# Patient Record
Sex: FEMALE | Race: WHITE | URBAN - METROPOLITAN AREA
[De-identification: names, ages, dates, MRNs, and addresses within clinical notes are randomized per-mention and may not be internally consistent; named-entity substitution may affect disease eponyms.]

---

## 2023-09-11 ENCOUNTER — OFFICE VISIT (OUTPATIENT)
Dept: ORTHOPEDIC SURGERY | Age: 83
End: 2023-09-11

## 2023-09-11 VITALS — BODY MASS INDEX: 28.35 KG/M2 | WEIGHT: 160 LBS | RESPIRATION RATE: 16 BRPM | HEIGHT: 63 IN

## 2023-09-11 DIAGNOSIS — M18.12 OSTEOARTHRITIS OF CARPOMETACARPAL (CMC) JOINT OF LEFT THUMB, UNSPECIFIED OSTEOARTHRITIS TYPE: Primary | ICD-10-CM

## 2023-09-11 NOTE — PROGRESS NOTES
Ms. Alondra Hope is a 80 y.o. right handed woman  who is seen today in Hand Surgical Consultation at the request of Zoila Steinberg MD.    She is seen today regarding a several year(s) history of bilateral basilar thumb pain without history of previous injury. She  was not seen for this concern by her primary care physician; previous treatment has included conservative measures, activity modification, avoidance of bothersome tasks, and OTC medication. She  reports moderate Enlargement about the base of the Thumb, Basilar Thumb pain, Thumb Stiffness, and Pain with pinch & grasp located about the base of both thumbs at the level of the [facility] Joint, no tenderness of the remaining hand, wrist, or elbow on either side. She notes today, no neurologic symptoms in the hands. Symptoms are improving over time. I have today reviewed with Alodnra Hope the clinically relevant, past medical history, medications, allergies,  family history, social history, and Review Of Systems & I have documented any details relevant to today's presenting complaints in my history above. Ms. Jodee Moreno's self-reported past medical history, medications, allergies,  family history, social history, and Review Of Systems have been scanned into the chart under the \"Media\" tab. Physical Exam:  Ms. Jodee Moreno's most recent vitals:  Vitals  Respirations: 16  Height: 5' 3\" (160 cm)  Weight - Scale: 160 lb (72.6 kg)    She is well nourished, oriented to person, place & time. She demonstrates appropriate mood and affect as well as normal gait and station. Skin: Normal in appearance, Normal Color, and Free of Lesions Bilateral   Finger range of motion is Full and equal bilaterally. Thumb range of motion is  decreased in all spheres at the basilar joint bilaterally   Wrist range of motion is Full and equal bilaterally   There is no evidence of gross joint instability bilaterally.   Sensation is subjectively normal and present

## 2024-06-05 ENCOUNTER — OFFICE VISIT (OUTPATIENT)
Dept: PULMONOLOGY | Age: 84
End: 2024-06-05
Payer: MEDICARE

## 2024-06-05 VITALS
WEIGHT: 161 LBS | HEIGHT: 63 IN | TEMPERATURE: 97.6 F | OXYGEN SATURATION: 95 % | BODY MASS INDEX: 28.53 KG/M2 | RESPIRATION RATE: 18 BRPM | SYSTOLIC BLOOD PRESSURE: 138 MMHG | HEART RATE: 127 BPM | DIASTOLIC BLOOD PRESSURE: 88 MMHG

## 2024-06-05 DIAGNOSIS — R06.02 SOB (SHORTNESS OF BREATH): Primary | ICD-10-CM

## 2024-06-05 DIAGNOSIS — G47.39 MIXED SLEEP APNEA: ICD-10-CM

## 2024-06-05 PROCEDURE — 99204 OFFICE O/P NEW MOD 45 MIN: CPT | Performed by: INTERNAL MEDICINE

## 2024-06-05 PROCEDURE — G8419 CALC BMI OUT NRM PARAM NOF/U: HCPCS | Performed by: INTERNAL MEDICINE

## 2024-06-05 PROCEDURE — 1036F TOBACCO NON-USER: CPT | Performed by: INTERNAL MEDICINE

## 2024-06-05 PROCEDURE — 1123F ACP DISCUSS/DSCN MKR DOCD: CPT | Performed by: INTERNAL MEDICINE

## 2024-06-05 PROCEDURE — G8400 PT W/DXA NO RESULTS DOC: HCPCS | Performed by: INTERNAL MEDICINE

## 2024-06-05 PROCEDURE — 1090F PRES/ABSN URINE INCON ASSESS: CPT | Performed by: INTERNAL MEDICINE

## 2024-06-05 PROCEDURE — G8427 DOCREV CUR MEDS BY ELIG CLIN: HCPCS | Performed by: INTERNAL MEDICINE

## 2024-06-05 RX ORDER — OMEPRAZOLE 20 MG/1
20 CAPSULE, DELAYED RELEASE ORAL DAILY
COMMUNITY

## 2024-06-05 RX ORDER — LISINOPRIL 40 MG/1
40 TABLET ORAL DAILY
COMMUNITY
Start: 2024-03-01

## 2024-06-05 RX ORDER — ASPIRIN 81 MG/1
81 TABLET, CHEWABLE ORAL DAILY
COMMUNITY
Start: 2024-03-04

## 2024-06-05 RX ORDER — FLECAINIDE ACETATE 50 MG/1
50 TABLET ORAL EVERY 12 HOURS PRN
COMMUNITY
Start: 2023-09-06

## 2024-06-05 RX ORDER — METOPROLOL SUCCINATE 25 MG/1
25 TABLET, EXTENDED RELEASE ORAL DAILY
COMMUNITY

## 2024-06-05 ASSESSMENT — ENCOUNTER SYMPTOMS
SHORTNESS OF BREATH: 1
WHEEZING: 0
STRIDOR: 0

## 2024-06-05 NOTE — ASSESSMENT & PLAN NOTE
-Looks like she was diagnosed with YOMAIRA with treatment emergent CSA, follows with [facility].  -Unable to tolerate PAP therapy

## 2024-06-05 NOTE — PROGRESS NOTES
[facility] PULMONARY, SLEEP, AND CRITICAL CARE    Madelaine Moreno (:  1940) is a 83 y.o. female,New patient, here for evaluation of the following chief complaint(s):  New Patient (Has afib causes her to breath diffrent)      Assessment & Plan   ASSESSMENT/PLAN:  1. SOB (shortness of breath)  Assessment & Plan:  -Unclear natural history.  There may be some dyspnea but I wonder if this is just related to her A-fib/RVR  -Will start with formal PFTs  Orders:  -     Full PFT Study With Bronchodilator; Future  2. Mixed sleep apnea  Assessment & Plan:  -Looks like she was diagnosed with YOMAIRA with treatment emergent CSA, follows with [facility].  -Unable to tolerate PAP therapy      Return in about 3 months (around 2024).  Future Appointments   Date Time Provider Department Center   2024  1:00 PM Chas Arredondo MD  PULM [facility]            Subjective   SUBJECTIVE/OBJECTIVE:  Former [id number] smoker quit almost 50 years ago presents for evaluation of dyspnea.  Patient has a known history of A-fib status post Watchman.  She has completion of symptoms regarding excessive daytime sleepiness for which she was previously diagnosed with possible mixed sleep apnea but was unable to tolerate PAP therapy, and dyspneic symptoms which may or may not be related to her A-fib.  She states she has never had pulmonary function testing performed.  Sometimes there is a cough.  Sometimes she has \"airway burning\".  She has been on bronchodilators unclear if they helped.        Review of Systems   Constitutional: Negative.    Respiratory:  Positive for shortness of breath. Negative for wheezing and stridor.    Cardiovascular: Negative.           Objective   Physical Exam     Gen:  No acute distress.   Eyes: EOMI. Anicteric sclera. No conjunctival injection.   ENT: No discharge.External appearance of ears and nose normal.  Neck: Trachea midline. No mass   Resp:  No crackles. No wheezes. No rhonchi. No dullness on

## 2024-06-05 NOTE — ASSESSMENT & PLAN NOTE
-Unclear natural history.  There may be some dyspnea but I wonder if this is just related to her A-fib/RVR  -Will start with formal PFTs

## 2024-06-14 ENCOUNTER — HOSPITAL ENCOUNTER (OUTPATIENT)
Dept: PULMONOLOGY | Age: 84
Discharge: HOME | End: 2024-06-14
Attending: INTERNAL MEDICINE
Payer: MEDICARE

## 2024-06-14 DIAGNOSIS — R06.02 SOB (SHORTNESS OF BREATH): ICD-10-CM

## 2024-06-14 LAB
DLCO %PRED: 82 %
DLCO PRED: NORMAL
DLCO/VA %PRED: NORMAL
DLCO/VA PRED: NORMAL
DLCO/VA: NORMAL
DLCO: NORMAL
EXPIRATORY TIME-POST: NORMAL
EXPIRATORY TIME: NORMAL
FEF 25-75 %CHNG: NORMAL
FEF 25-75 POST %PRED: NORMAL
FEF 25-75% %PRED-PRE: NORMAL
FEF 25-75% PRED: NORMAL
FEF 25-75-POST: NORMAL
FEF 25-75-PRE: NORMAL
FEV1 %PRED-POST: 65 %
FEV1 %PRED-PRE: 67 %
FEV1 PRED: NORMAL
FEV1-POST: NORMAL
FEV1-PRE: NORMAL
FEV1/FVC %PRED-POST: NORMAL
FEV1/FVC %PRED-PRE: NORMAL
FEV1/FVC PRED: NORMAL
FEV1/FVC-POST: 89 %
FEV1/FVC-PRE: 83 %
FVC %PRED-POST: 55 L
FVC %PRED-PRE: 61 %
FVC PRED: NORMAL
FVC-POST: 1.22 L
FVC-PRE: 1.36 L
GAW %PRED: NORMAL
GAW PRED: NORMAL
GAW: NORMAL
IC PRE %PRED: NORMAL
IC PRED: NORMAL
IC: NORMAL
MEP: NORMAL
MIP: NORMAL
MVV %PRED-PRE: NORMAL
MVV PRED: NORMAL
MVV-PRE: NORMAL
PEF %PRED-POST: NORMAL
PEF %PRED-PRE: NORMAL
PEF PRED: NORMAL
PEF%CHNG: NORMAL
PEF-POST: NORMAL
PEF-PRE: NORMAL
RAW %PRED: NORMAL
RAW PRED: NORMAL
RAW: NORMAL
RV PRE %PRED: NORMAL
RV PRED: NORMAL
RV: NORMAL
SVC %PRED: NORMAL
SVC PRED: NORMAL
SVC: NORMAL
TLC PRE %PRED: 63 %
TLC PRED: NORMAL
TLC: NORMAL
VA %PRED: NORMAL
VA PRED: NORMAL
VA: NORMAL
VTG %PRED: NORMAL
VTG PRED: NORMAL
VTG: NORMAL

## 2024-06-14 PROCEDURE — 6370000000 HC RX 637 (ALT 250 FOR IP): Performed by: INTERNAL MEDICINE

## 2024-06-14 PROCEDURE — 94060 EVALUATION OF WHEEZING: CPT

## 2024-06-14 PROCEDURE — 94640 AIRWAY INHALATION TREATMENT: CPT

## 2024-06-14 PROCEDURE — 94726 PLETHYSMOGRAPHY LUNG VOLUMES: CPT

## 2024-06-14 PROCEDURE — 94729 DIFFUSING CAPACITY: CPT

## 2024-06-14 PROCEDURE — 94664 DEMO&/EVAL PT USE INHALER: CPT

## 2024-06-14 RX ORDER — ALBUTEROL SULFATE 90 UG/1
4 AEROSOL, METERED RESPIRATORY (INHALATION) ONCE
Status: COMPLETED | OUTPATIENT
Start: 2024-06-14 | End: 2024-06-14

## 2024-06-14 RX ADMIN — ALBUTEROL SULFATE 4 PUFF: 90 AEROSOL, METERED RESPIRATORY (INHALATION) at 12:13

## 2024-06-14 ASSESSMENT — PULMONARY FUNCTION TESTS
FEV1_PERCENT_PREDICTED_PRE: 67
FVC_PRE: 1.36
FVC_PERCENT_PREDICTED_POST: 55
FEV1_PERCENT_PREDICTED_POST: 65
FVC_PERCENT_PREDICTED_PRE: 61
FEV1/FVC_PRE: 83
FEV1/FVC_POST: 89
FVC_POST: 1.22

## 2024-06-15 NOTE — PROCEDURES
PROCEDURE NOTE  Date: 6/15/2024   Name: Madelaine Moreno  YOB: 1940        Spirometry was acceptable and reproducible by ATS standards      Spirometry/Flow volume loop:  Spirometry and Flow volume loops suggestive of restriction.  FEV1 65%, FVC 55%.  Can order Lung volumes for further characterization if clinically warranted.  There is no bronchodilator response.    Lung volumes:  Lung volumes confirm restriction    Diffusing capacity:  Diffusing capacity within normal limits    Summary:    Restrictive lung disease, preserved diffusion capacity.    FEV1 %Pred-Post   Date Value Ref Range Status   06/14/2024 65 % Final     FEV1/FVC-Post   Date Value Ref Range Status   06/14/2024 89 % Final     TLC Pre %Pred   Date Value Ref Range Status   06/14/2024 63 % Final     DLCO %Pred   Date Value Ref Range Status   06/14/2024 82 % Final       PFT data will be scanned into the media tab under this encounter. Please see the scanned data for numerical values.     Chas Arredondo MD  [facility] Pulmonary, Sleep and Critical Care Medicine

## 2024-07-01 ENCOUNTER — TELEPHONE (OUTPATIENT)
Dept: PULMONOLOGY | Age: 84
End: 2024-07-01

## 2024-07-01 NOTE — TELEPHONE ENCOUNTER
Patient is upset she has not received a call about her procedure results from 6/14/24. Can leave a message. Patient must be called by noon tomorrow.

## 2024-07-03 NOTE — TELEPHONE ENCOUNTER
Pt called upset stating that we never called her about the results. I informed her that yesterday Rafaela called and left her a detailed message about the results. She checked her phone and said she did see where we called but she didn't listen to her voice message. She is requesting another call from us to go over the results.

## 2024-07-08 ENCOUNTER — TELEPHONE (OUTPATIENT)
Dept: PULMONOLOGY | Age: 84
End: 2024-07-08

## 2024-07-08 DIAGNOSIS — J98.4 RESTRICTIVE LUNG DISEASE: Primary | ICD-10-CM

## 2024-07-08 NOTE — TELEPHONE ENCOUNTER
Pt wants to go forward with CT scan. Pt said she can't walk 50 feet with out SOB an scared to go to sleep thinks she will die.

## 2024-07-24 ENCOUNTER — HOSPITAL ENCOUNTER (OUTPATIENT)
Dept: CT IMAGING | Age: 84
Discharge: HOME | End: 2024-07-24
Attending: INTERNAL MEDICINE
Payer: MEDICARE

## 2024-07-24 DIAGNOSIS — J98.4 RESTRICTIVE LUNG DISEASE: ICD-10-CM

## 2024-07-24 PROCEDURE — 71250 CT THORAX DX C-: CPT

## 2024-07-26 ENCOUNTER — OFFICE VISIT (OUTPATIENT)
Dept: PULMONOLOGY | Age: 84
End: 2024-07-26
Payer: MEDICARE

## 2024-07-26 VITALS
HEIGHT: 63 IN | HEART RATE: 126 BPM | OXYGEN SATURATION: 96 % | DIASTOLIC BLOOD PRESSURE: 80 MMHG | BODY MASS INDEX: 28.7 KG/M2 | TEMPERATURE: 97.1 F | SYSTOLIC BLOOD PRESSURE: 120 MMHG | WEIGHT: 162 LBS | RESPIRATION RATE: 18 BRPM

## 2024-07-26 DIAGNOSIS — R06.02 SOB (SHORTNESS OF BREATH): Primary | ICD-10-CM

## 2024-07-26 PROCEDURE — 1090F PRES/ABSN URINE INCON ASSESS: CPT | Performed by: INTERNAL MEDICINE

## 2024-07-26 PROCEDURE — G8427 DOCREV CUR MEDS BY ELIG CLIN: HCPCS | Performed by: INTERNAL MEDICINE

## 2024-07-26 PROCEDURE — 1123F ACP DISCUSS/DSCN MKR DOCD: CPT | Performed by: INTERNAL MEDICINE

## 2024-07-26 PROCEDURE — 1036F TOBACCO NON-USER: CPT | Performed by: INTERNAL MEDICINE

## 2024-07-26 PROCEDURE — G8400 PT W/DXA NO RESULTS DOC: HCPCS | Performed by: INTERNAL MEDICINE

## 2024-07-26 PROCEDURE — 99214 OFFICE O/P EST MOD 30 MIN: CPT | Performed by: INTERNAL MEDICINE

## 2024-07-26 PROCEDURE — G8419 CALC BMI OUT NRM PARAM NOF/U: HCPCS | Performed by: INTERNAL MEDICINE

## 2024-07-26 ASSESSMENT — ENCOUNTER SYMPTOMS
SHORTNESS OF BREATH: 1
STRIDOR: 0
WHEEZING: 0

## 2024-07-26 NOTE — ASSESSMENT & PLAN NOTE
-Highly suspicious for cardiac etiology given pulmonary edema and tachycardia.  -I have asked her to use the albuterol very judiciously as this may exacerbate her palpitations and dyspnea  -Unclear that she has underlying airways disease but will prescribe Atrovent inhaler to hopefully provide some symptomatic relief without exacerbating her tachycardia.  -She has follow-up with her cardiologist.

## 2024-07-26 NOTE — PROGRESS NOTES
[facility] PULMONARY, SLEEP, AND CRITICAL CARE    Madelaine Moreno (:  1940) is a 83 y.o. female,New patient, here for evaluation of the following chief complaint(s):  Follow-up (Results/) and Shortness of Breath      Assessment & Plan   ASSESSMENT/PLAN:  1. SOB (shortness of breath)  Assessment & Plan:  -Highly suspicious for cardiac etiology given pulmonary edema and tachycardia.  -I have asked her to use the albuterol very judiciously as this may exacerbate her palpitations and dyspnea  -Unclear that she has underlying airways disease but will prescribe Atrovent inhaler to hopefully provide some symptomatic relief without exacerbating her tachycardia.  -She has follow-up with her cardiologist.  Orders:  -     ipratropium (ATROVENT HFA) 17 MCG/ACT inhaler; Inhale 1 puff into the lungs 3 times daily, Disp-1 each, R-3Normal      No follow-ups on file.  Future Appointments   Date Time Provider Department Center   2024  1:00 PM Chas Arredondo MD  PULM [facility]            Subjective   SUBJECTIVE/OBJECTIVE:  Former [id number] smoker quit almost 50 years ago presents for follow-up shortness of breath.  States that she has had onset shortness of breath that occurs at rest and while exertion.  Is waxing and waning.  Only gets wheezing and cough when she has her upper respiratory infection.  Was recently given albuterol inhaler but is unclear if this is helped.  During my visit patient is short of breath her heart rate is irregular and tacky.  She had a CT scan that showed some mild interstitial edema and small bilateral pleural effusions.  She is currently on flecainide and metoprolol.  We discussed albuterol usage    Shortness of Breath  Pertinent negatives include no wheezing.       Review of Systems   Constitutional: Negative.    Respiratory:  Positive for shortness of breath. Negative for wheezing and stridor.    Cardiovascular: Negative.           Objective   Physical Exam     Gen:  No acute